# Patient Record
Sex: MALE | Race: WHITE | NOT HISPANIC OR LATINO | Employment: UNEMPLOYED | URBAN - METROPOLITAN AREA
[De-identification: names, ages, dates, MRNs, and addresses within clinical notes are randomized per-mention and may not be internally consistent; named-entity substitution may affect disease eponyms.]

---

## 2021-07-05 ENCOUNTER — HOSPITAL ENCOUNTER (EMERGENCY)
Facility: MEDICAL CENTER | Age: 31
End: 2021-07-05
Attending: EMERGENCY MEDICINE
Payer: COMMERCIAL

## 2021-07-05 VITALS
OXYGEN SATURATION: 94 % | HEART RATE: 97 BPM | HEIGHT: 71 IN | BODY MASS INDEX: 25.15 KG/M2 | WEIGHT: 179.68 LBS | DIASTOLIC BLOOD PRESSURE: 90 MMHG | RESPIRATION RATE: 18 BRPM | SYSTOLIC BLOOD PRESSURE: 127 MMHG | TEMPERATURE: 98.1 F

## 2021-07-05 DIAGNOSIS — F13.10 BENZODIAZEPINE ABUSE (HCC): ICD-10-CM

## 2021-07-05 DIAGNOSIS — F41.9 ANXIETY: ICD-10-CM

## 2021-07-05 DIAGNOSIS — F15.10 METHAMPHETAMINE USE (HCC): ICD-10-CM

## 2021-07-05 DIAGNOSIS — F22 PARANOIA (HCC): ICD-10-CM

## 2021-07-05 LAB
ALBUMIN SERPL BCP-MCNC: 4.6 G/DL (ref 3.2–4.9)
ALBUMIN/GLOB SERPL: 1.2 G/DL
ALP SERPL-CCNC: 86 U/L (ref 30–99)
ALT SERPL-CCNC: 54 U/L (ref 2–50)
AMPHET UR QL SCN: POSITIVE
ANION GAP SERPL CALC-SCNC: 13 MMOL/L (ref 7–16)
AST SERPL-CCNC: 124 U/L (ref 12–45)
BARBITURATES UR QL SCN: NEGATIVE
BASOPHILS # BLD AUTO: 0.4 % (ref 0–1.8)
BASOPHILS # BLD: 0.05 K/UL (ref 0–0.12)
BENZODIAZ UR QL SCN: POSITIVE
BILIRUB SERPL-MCNC: 1.5 MG/DL (ref 0.1–1.5)
BUN SERPL-MCNC: 24 MG/DL (ref 8–22)
BZE UR QL SCN: NEGATIVE
CALCIUM SERPL-MCNC: 9.6 MG/DL (ref 8.5–10.5)
CANNABINOIDS UR QL SCN: NEGATIVE
CHLORIDE SERPL-SCNC: 95 MMOL/L (ref 96–112)
CO2 SERPL-SCNC: 23 MMOL/L (ref 20–33)
CREAT SERPL-MCNC: 1.09 MG/DL (ref 0.5–1.4)
EOSINOPHIL # BLD AUTO: 0.13 K/UL (ref 0–0.51)
EOSINOPHIL NFR BLD: 1.2 % (ref 0–6.9)
ERYTHROCYTE [DISTWIDTH] IN BLOOD BY AUTOMATED COUNT: 39.4 FL (ref 35.9–50)
ETHANOL BLD-MCNC: <10.1 MG/DL (ref 0–10)
GLOBULIN SER CALC-MCNC: 4 G/DL (ref 1.9–3.5)
GLUCOSE SERPL-MCNC: 93 MG/DL (ref 65–99)
HCT VFR BLD AUTO: 44.8 % (ref 42–52)
HGB BLD-MCNC: 15 G/DL (ref 14–18)
IMM GRANULOCYTES # BLD AUTO: 0.04 K/UL (ref 0–0.11)
IMM GRANULOCYTES NFR BLD AUTO: 0.4 % (ref 0–0.9)
LYMPHOCYTES # BLD AUTO: 1.65 K/UL (ref 1–4.8)
LYMPHOCYTES NFR BLD: 14.8 % (ref 22–41)
MCH RBC QN AUTO: 25.8 PG (ref 27–33)
MCHC RBC AUTO-ENTMCNC: 33.5 G/DL (ref 33.7–35.3)
MCV RBC AUTO: 77 FL (ref 81.4–97.8)
METHADONE UR QL SCN: NEGATIVE
MONOCYTES # BLD AUTO: 1.06 K/UL (ref 0–0.85)
MONOCYTES NFR BLD AUTO: 9.5 % (ref 0–13.4)
NEUTROPHILS # BLD AUTO: 8.23 K/UL (ref 1.82–7.42)
NEUTROPHILS NFR BLD: 73.7 % (ref 44–72)
NRBC # BLD AUTO: 0 K/UL
NRBC BLD-RTO: 0 /100 WBC
OPIATES UR QL SCN: POSITIVE
OXYCODONE UR QL SCN: NEGATIVE
PCP UR QL SCN: NEGATIVE
PLATELET # BLD AUTO: 333 K/UL (ref 164–446)
PMV BLD AUTO: 9.8 FL (ref 9–12.9)
POTASSIUM SERPL-SCNC: 3.8 MMOL/L (ref 3.6–5.5)
PROPOXYPH UR QL SCN: NEGATIVE
PROT SERPL-MCNC: 8.6 G/DL (ref 6–8.2)
RBC # BLD AUTO: 5.82 M/UL (ref 4.7–6.1)
SODIUM SERPL-SCNC: 131 MMOL/L (ref 135–145)
WBC # BLD AUTO: 11.2 K/UL (ref 4.8–10.8)

## 2021-07-05 PROCEDURE — 80053 COMPREHEN METABOLIC PANEL: CPT

## 2021-07-05 PROCEDURE — 82077 ASSAY SPEC XCP UR&BREATH IA: CPT

## 2021-07-05 PROCEDURE — 80307 DRUG TEST PRSMV CHEM ANLYZR: CPT

## 2021-07-05 PROCEDURE — 99284 EMERGENCY DEPT VISIT MOD MDM: CPT

## 2021-07-05 PROCEDURE — 90791 PSYCH DIAGNOSTIC EVALUATION: CPT

## 2021-07-05 PROCEDURE — 85025 COMPLETE CBC W/AUTO DIFF WBC: CPT
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
## 2021-07-05 NOTE — ED PROVIDER NOTES
ED Provider Note    Scribed for Ruiz Thomas M.D. by Viktoria Mcnulty. 7/5/2021  8:29 AM    Primary care provider: No primary care provider noted.   Means of arrival: Walk-in  History obtained from: Patient  History limited by: None    CHIEF COMPLAINT  Chief Complaint   Patient presents with   • Drug Withdrawal     attempted to stop taking xanax after long term use for panic attacks   • Paranoid     increasing since attempting to self medicate   • Psych Eval     for anxiety attacks, delusions and paranoia       HPI  Jaylon Villalta is a 30 y.o. male who presents to the Emergency Department for substance abuse onset 2 months ago. He says he has been taking Xanax for years for panic attacks. He has been under a lot of stress recently because he has had to move in with his mother who is aggressive and he has lost his job, so he has been self medicating with Xanax and methamphetamine. He has been taking three 2 mg bars of Xanax daily. He last used Xanax 4 days ago. He last used methamphetamine 3 days ago. He says he has been purchasing Xanax off of the street. He says he has recently developed auditory hallucinations after stopping the use of the Xanax. He does endorse suicidal ideation. He has had one pervious attempt where he purposely overdosed on heroin. He has been diagnosed with Agoraphobia and Panic Disorder. He has seen a psychiatrist in the past, but he does not see one currently. He is currently en route to either [place] or [place]. He denies fever, abdominal pain, headache, cough, and diarrhea. He says he was admitted to [facility] in the past and found it helpful.     REVIEW OF SYSTEMS  Pertinent positives include: substance abuse, suicidal ideation, auditory hallucination.   Pertinent negatives include: fever, abdominal pain, headache, cough, and diarrhea.  10+ systems reviewed and normal    PAST MEDICAL HISTORY  Past Medical History:   Diagnosis Date   • Panic attack        SOCIAL HISTORY  Social  "History     Tobacco Use   • Smoking status: Never Smoker   • Smokeless tobacco: Never Used   Vaping Use   • Vaping Use: Never used   Substance Use Topics   • Alcohol use: Yes   • Drug use: Yes     Comment: meth after being drunk     Social History     Substance and Sexual Activity   Drug Use Yes    Comment: meth after being drunk       CURRENT MEDICATIONS  No current outpatient medications    ALLERGIES  No Known Allergies    PHYSICAL EXAM  VITAL SIGNS: /94   Pulse (!) 122   Temp 36.7 °C (98.1 °F) (Temporal)   Resp 18   Ht 1.803 m (5' 11\")   Wt 81.5 kg (179 lb 10.8 oz)   SpO2 94%   BMI 25.06 kg/m²   Reviewed and tachycardic    Constitutional: Well developed, Well nourished.   HENT: Normocephalic, atraumatic, bilateral external ears normal, wearing a mask.   Eyes: PERRLA, conjunctiva pink, no scleral icterus.   Cardiovascular: Regular rate and rhythm. No murmurs, rubs or gallops.  No dependent edema or calf tenderness  Respiratory: Lungs clear to auscultation bilaterally. No wheezes, rales, or rhonchi.  Abdominal:  Abdomen soft, non-tender, non distended. No rebound, or guarding.    Skin: No erythema, no rash.   Genitourinary: No costovertebral angle tenderness.   Musculoskeletal: no deformities.   Neurologic: Alert & oriented x 3, cranial nerves 2-12 intact by passive exam.  No focal deficit noted.  Psychiatric: Mildly anxious affect, no obvious delusions.  Not responding to internal stimuli, somewhat poor judgment, well groomed    DIFFERENTIAL DIAGNOSIS:  Anxiety, benzodiazepine withdrawal and abuse, methamphetamine use, suicidal ideation, depression.     LABORATORY:   Results for orders placed or performed during the hospital encounter of 07/05/21   CBC WITH DIFFERENTIAL   Result Value Ref Range    WBC 11.2 (H) 4.8 - 10.8 K/uL    RBC 5.82 4.70 - 6.10 M/uL    Hemoglobin 15.0 14.0 - 18.0 g/dL    Hematocrit 44.8 42.0 - 52.0 %    MCV 77.0 (L) 81.4 - 97.8 fL    MCH 25.8 (L) 27.0 - 33.0 pg    MCHC 33.5 (L) " 33.7 - 35.3 g/dL    RDW 39.4 35.9 - 50.0 fL    Platelet Count 333 164 - 446 K/uL    MPV 9.8 9.0 - 12.9 fL    Neutrophils-Polys 73.70 (H) 44.00 - 72.00 %    Lymphocytes 14.80 (L) 22.00 - 41.00 %    Monocytes 9.50 0.00 - 13.40 %    Eosinophils 1.20 0.00 - 6.90 %    Basophils 0.40 0.00 - 1.80 %    Immature Granulocytes 0.40 0.00 - 0.90 %    Nucleated RBC 0.00 /100 WBC    Neutrophils (Absolute) 8.23 (H) 1.82 - 7.42 K/uL    Lymphs (Absolute) 1.65 1.00 - 4.80 K/uL    Monos (Absolute) 1.06 (H) 0.00 - 0.85 K/uL    Eos (Absolute) 0.13 0.00 - 0.51 K/uL    Baso (Absolute) 0.05 0.00 - 0.12 K/uL    Immature Granulocytes (abs) 0.04 0.00 - 0.11 K/uL    NRBC (Absolute) 0.00 K/uL   Comp Metabolic Panel   Result Value Ref Range    Sodium 131 (L) 135 - 145 mmol/L    Potassium 3.8 3.6 - 5.5 mmol/L    Chloride 95 (L) 96 - 112 mmol/L    Co2 23 20 - 33 mmol/L    Anion Gap 13.0 7.0 - 16.0    Glucose 93 65 - 99 mg/dL    Bun 24 (H) 8 - 22 mg/dL    Creatinine 1.09 0.50 - 1.40 mg/dL    Calcium 9.6 8.5 - 10.5 mg/dL    AST(SGOT) 124 (H) 12 - 45 U/L    ALT(SGPT) 54 (H) 2 - 50 U/L    Alkaline Phosphatase 86 30 - 99 U/L    Total Bilirubin 1.5 0.1 - 1.5 mg/dL    Albumin 4.6 3.2 - 4.9 g/dL    Total Protein 8.6 (H) 6.0 - 8.2 g/dL    Globulin 4.0 (H) 1.9 - 3.5 g/dL    A-G Ratio 1.2 g/dL   DIAGNOSTIC ALCOHOL   Result Value Ref Range    Diagnostic Alcohol <10.1 0.0 - 10.0 mg/dL   URINE DRUG SCREEN   Result Value Ref Range    Amphetamines Urine Positive (A) Negative    Barbiturates Negative Negative    Benzodiazepines Positive (A) Negative    Cocaine Metabolite Negative Negative    Methadone Negative Negative    Opiates Positive (A) Negative    Oxycodone Negative Negative    Phencyclidine -Pcp Negative Negative    Propoxyphene Negative Negative    Cannabinoid Metab Negative Negative       Lab results reviewed by me.     ED COURSE:  Nursing notes, VS, PMSFHx reviewed in chart.     8:29 AM - Patient seen and examined at bedside. Ordered CBC with diff, CMP,  "Diagnostic Alcohol, and Urine Drug Screen to evaluate. I discussed the plan to have him talk to life skills and to put a consult out for behavioral health. Patient verbalizes understanding and agreement to this plan of care.     10:45 AM - I spoke with life skills who think it is okay to discharge the patient at this time. He denies suicidal and homicidal ideation.     10:57 AM - Patient misunderstood the instructions the nurse provided and eloped without his discharge paperwork.     Life skills consultation obtained who felt that the patient was not a suicidal risk.  Patient was given resources for outpatient follow-up.  Patient then eloped before waiting for discharge.    MEDICAL DECISION MAKING:  Well-appearing patient with history of anxiety presents with some reported hallucinations likely due to methamphetamine use and withdrawing from alprazolam which he has been obtaining on the street.  He is not acutely suicidal.  He was given resources for outpatient follow-up for anxiety preventive medication.  He is encouraged to discontinue methamphetamines.  Vital signs at discharge improved as below without intervention    /90   Pulse 97   Temp 36.7 °C (98.1 °F) (Temporal)   Resp 18   Ht 1.803 m (5' 11\")   Wt 81.5 kg (179 lb 10.8 oz)   SpO2 94%   BMI 25.06 kg/m²     PLAN:  Patient eloped without discharge paperwork.   Patient referrals given by life skills    FINAL IMPRESSION  1. Benzodiazepine abuse (HCC)    2. Anxiety    3. Methamphetamine use (HCC)    4. Paranoia (HCC)          Viktoria LENNON (Aaliyah), am scribing for, and in the presence of, Ruiz Thomas M.D..    Electronically signed by: Viktoria Mcnulty (Aaliyah), 7/5/2021    Ruiz LENNON M.D. personally performed the services described in this documentation, as scribed by Viktoria Mcnulty in my presence, and it is both accurate and complete.    The note accurately reflects work and decisions made by me.  Ruiz Thomas M.D.  7/5/2021  1:02 PM      "
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
## 2021-07-05 NOTE — DISCHARGE INSTRUCTIONS
Patient was given references by life skills.  He left the ER afterwards before he could be discharged.

## 2021-07-05 NOTE — ED TRIAGE NOTES
Jaylon Villalta  Chief Complaint   Patient presents with   • Drug Withdrawal     attempted to stop taking xanax after long term use for panic attacks   • Paranoid     increasing since attempting to self medicate   • Psych Eval     for anxiety attacks, delusions and paranoia     Pt ambulatory to triage with above complaint.  VSS, no acute distress.  Pt with flight of ideas in triage, admits to delusions and hallucinations.  Concerned that he is having black outs and losing phone and bank card after recently moving to [place].    Pt denying SI/ HI.    Pt/staff masked and in appropriate PPE during encounter.

## 2021-07-05 NOTE — ED NOTES
Pt provided w/ community resources for detox.  Pt walking out of the department following his evaluation by the alert team.  Pt reminded that he has an IV in place.  IV dc'd intact.  Pt provided w/ phone numbers of [employer], per his request, so he can attempt to locate his bank card.  Pt ambulated out of the ER prior to receiving ER discharge paperwork & signing.